# Patient Record
Sex: FEMALE | Race: WHITE | NOT HISPANIC OR LATINO | URBAN - METROPOLITAN AREA
[De-identification: names, ages, dates, MRNs, and addresses within clinical notes are randomized per-mention and may not be internally consistent; named-entity substitution may affect disease eponyms.]

---

## 2017-07-26 ENCOUNTER — APPOINTMENT (RX ONLY)
Dept: URBAN - METROPOLITAN AREA CLINIC 368 | Facility: CLINIC | Age: 82
Setting detail: DERMATOLOGY
End: 2017-07-26

## 2017-07-26 DIAGNOSIS — L259 CONTACT DERMATITIS AND OTHER ECZEMA, UNSPECIFIED CAUSE: ICD-10-CM

## 2017-07-26 PROBLEM — L30.9 DERMATITIS, UNSPECIFIED: Status: ACTIVE | Noted: 2017-07-26

## 2017-07-26 PROBLEM — E03.9 HYPOTHYROIDISM, UNSPECIFIED: Status: ACTIVE | Noted: 2017-07-26

## 2017-07-26 PROBLEM — M12.9 ARTHROPATHY, UNSPECIFIED: Status: ACTIVE | Noted: 2017-07-26

## 2017-07-26 PROBLEM — Z85.828 PERSONAL HISTORY OF OTHER MALIGNANT NEOPLASM OF SKIN: Status: ACTIVE | Noted: 2017-07-26

## 2017-07-26 PROBLEM — L70.0 ACNE VULGARIS: Status: ACTIVE | Noted: 2017-07-26

## 2017-07-26 PROCEDURE — 99202 OFFICE O/P NEW SF 15 MIN: CPT

## 2017-07-26 PROCEDURE — ? PRESCRIPTION

## 2017-07-26 PROCEDURE — ? TREATMENT REGIMEN

## 2017-07-26 PROCEDURE — ? COUNSELING

## 2017-07-26 RX ORDER — TRIAMCINOLONE ACETONIDE 1 MG/G
CREAM TOPICAL BID
Qty: 1 | Refills: 1 | Status: ERX | COMMUNITY
Start: 2017-07-26

## 2017-07-26 RX ADMIN — TRIAMCINOLONE ACETONIDE: 1 CREAM TOPICAL at 15:55

## 2017-07-26 NOTE — PROCEDURE: MIPS QUALITY
Detail Level: Detailed
Quality 226: Preventive Care And Screening: Tobacco Use: Screening And Cessation Intervention: Patient screened for tobacco and never smoked
Quality 226: Preventive Care And Screening: Tobacco Use: Screening And Cessation Intervention: Patient screened for tobacco and is an ex-smoker

## 2017-07-26 NOTE — PROCEDURE: TREATMENT REGIMEN
Plan: Wash face with Cetaphil cleanser\\nPat dry Apply Triamcinolone cream twice a day for two weeks
Detail Level: Simple